# Patient Record
Sex: MALE | Race: WHITE | Employment: FULL TIME | URBAN - METROPOLITAN AREA
[De-identification: names, ages, dates, MRNs, and addresses within clinical notes are randomized per-mention and may not be internally consistent; named-entity substitution may affect disease eponyms.]

---

## 2019-04-05 ENCOUNTER — HOSPITAL ENCOUNTER (OUTPATIENT)
Age: 39
Setting detail: SPECIMEN
Discharge: HOME | End: 2019-04-05
Payer: COMMERCIAL

## 2019-04-06 LAB
ABSOLUTE EOS #: 0.37 K/UL (ref 0–0.44)
ABSOLUTE IMMATURE GRANULOCYTE: 0.06 K/UL (ref 0–0.3)
ABSOLUTE LYMPH #: 2.73 K/UL (ref 1.1–3.7)
ABSOLUTE MONO #: 0.96 K/UL (ref 0.1–1.2)
ALBUMIN SERPL-MCNC: 4.4 G/DL (ref 3.5–5.2)
ALBUMIN/GLOBULIN RATIO: 1.8 (ref 1–2.5)
ALP BLD-CCNC: 57 U/L (ref 40–129)
ALT SERPL-CCNC: 65 U/L (ref 5–41)
ANION GAP SERPL CALCULATED.3IONS-SCNC: 13 MMOL/L (ref 9–17)
AST SERPL-CCNC: 33 U/L
BASOPHILS # BLD: 1 % (ref 0–2)
BASOPHILS ABSOLUTE: 0.07 K/UL (ref 0–0.2)
BILIRUB SERPL-MCNC: 0.35 MG/DL (ref 0.3–1.2)
BUN BLDV-MCNC: 18 MG/DL (ref 6–20)
BUN/CREAT BLD: ABNORMAL (ref 9–20)
CALCIUM SERPL-MCNC: 9.4 MG/DL (ref 8.6–10.4)
CHLORIDE BLD-SCNC: 103 MMOL/L (ref 98–107)
CHOLESTEROL/HDL RATIO: 5.6
CHOLESTEROL: 185 MG/DL
CO2: 26 MMOL/L (ref 20–31)
CREAT SERPL-MCNC: 0.9 MG/DL (ref 0.7–1.2)
DIFFERENTIAL TYPE: ABNORMAL
EOSINOPHILS RELATIVE PERCENT: 3 % (ref 1–4)
GFR AFRICAN AMERICAN: >60 ML/MIN
GFR NON-AFRICAN AMERICAN: >60 ML/MIN
GFR SERPL CREATININE-BSD FRML MDRD: ABNORMAL ML/MIN/{1.73_M2}
GFR SERPL CREATININE-BSD FRML MDRD: ABNORMAL ML/MIN/{1.73_M2}
GLUCOSE BLD-MCNC: 91 MG/DL (ref 70–99)
HCT VFR BLD CALC: 47.2 % (ref 40.7–50.3)
HDLC SERPL-MCNC: 33 MG/DL
HEMOGLOBIN: 16.1 G/DL (ref 13–17)
IMMATURE GRANULOCYTES: 1 %
LDL CHOLESTEROL: 103 MG/DL (ref 0–130)
LYMPHOCYTES # BLD: 25 % (ref 24–43)
MCH RBC QN AUTO: 33.1 PG (ref 25.2–33.5)
MCHC RBC AUTO-ENTMCNC: 34.1 G/DL (ref 28.4–34.8)
MCV RBC AUTO: 97.1 FL (ref 82.6–102.9)
MONOCYTES # BLD: 9 % (ref 3–12)
NRBC AUTOMATED: 0 PER 100 WBC
PDW BLD-RTO: 11.9 % (ref 11.8–14.4)
PLATELET # BLD: 297 K/UL (ref 138–453)
PLATELET ESTIMATE: ABNORMAL
PMV BLD AUTO: 10.2 FL (ref 8.1–13.5)
POTASSIUM SERPL-SCNC: 4 MMOL/L (ref 3.7–5.3)
RBC # BLD: 4.86 M/UL (ref 4.21–5.77)
RBC # BLD: ABNORMAL 10*6/UL
SEG NEUTROPHILS: 61 % (ref 36–65)
SEGMENTED NEUTROPHILS ABSOLUTE COUNT: 6.68 K/UL (ref 1.5–8.1)
SODIUM BLD-SCNC: 142 MMOL/L (ref 135–144)
TOTAL PROTEIN: 6.8 G/DL (ref 6.4–8.3)
TRIGL SERPL-MCNC: 246 MG/DL
TSH SERPL DL<=0.05 MIU/L-ACNC: 1.46 MIU/L (ref 0.3–5)
VLDLC SERPL CALC-MCNC: ABNORMAL MG/DL (ref 1–30)
WBC # BLD: 10.9 K/UL (ref 3.5–11.3)
WBC # BLD: ABNORMAL 10*3/UL

## 2019-04-11 LAB — VON WILLEBRAND AG: 97 % (ref 50–160)

## 2019-06-26 ENCOUNTER — HOSPITAL ENCOUNTER (OUTPATIENT)
Age: 39
Setting detail: SPECIMEN
Discharge: HOME | End: 2019-06-26
Payer: COMMERCIAL

## 2019-06-27 LAB
C. TRACHOMATIS DNA ,URINE: NEGATIVE
N. GONORRHOEAE DNA, URINE: NEGATIVE
SOURCE: NORMAL
SPECIMEN DESCRIPTION: NORMAL
TRICHOMONAS VAGINALI, MOLECULAR: NEGATIVE

## 2021-09-09 ENCOUNTER — APPOINTMENT (OUTPATIENT)
Dept: GENERAL RADIOLOGY | Age: 41
End: 2021-09-09
Payer: COMMERCIAL

## 2021-09-09 ENCOUNTER — HOSPITAL ENCOUNTER (EMERGENCY)
Age: 41
Discharge: HOME | End: 2021-09-09
Payer: COMMERCIAL

## 2021-09-09 VITALS
TEMPERATURE: 97.9 F | DIASTOLIC BLOOD PRESSURE: 89 MMHG | WEIGHT: 315 LBS | BODY MASS INDEX: 40.43 KG/M2 | HEIGHT: 74 IN | HEART RATE: 84 BPM | OXYGEN SATURATION: 97 % | SYSTOLIC BLOOD PRESSURE: 134 MMHG | RESPIRATION RATE: 16 BRPM
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
## 2021-09-09 DIAGNOSIS — M79.18 MUSCULOSKELETAL PAIN: ICD-10-CM

## 2021-09-09 PROCEDURE — 73130 X-RAY EXAM OF HAND: CPT

## 2021-09-09 PROCEDURE — 72100 X-RAY EXAM L-S SPINE 2/3 VWS: CPT

## 2021-09-09 PROCEDURE — 99213 OFFICE O/P EST LOW 20 MIN: CPT

## 2021-09-09 PROCEDURE — 72040 X-RAY EXAM NECK SPINE 2-3 VW: CPT

## 2021-09-09 PROCEDURE — 99203 OFFICE O/P NEW LOW 30 MIN: CPT | Performed by: NURSE PRACTITIONER

## 2021-09-09 RX ORDER — CYCLOBENZAPRINE HCL 10 MG
10 TABLET ORAL NIGHTLY PRN
Qty: 7 TABLET | Refills: 0 | Status: SHIPPED | OUTPATIENT
Start: 2021-09-09 | End: 2021-09-16

## 2021-09-09 RX ORDER — IBUPROFEN 800 MG/1
800 TABLET ORAL EVERY 8 HOURS PRN
Qty: 30 TABLET | Refills: 0 | Status: SHIPPED | OUTPATIENT
Start: 2021-09-09

## 2021-09-09 RX ORDER — IBUPROFEN 400 MG/1
400 TABLET ORAL EVERY 6 HOURS PRN
COMMUNITY
End: 2021-09-09

## 2021-09-09 RX ORDER — FUROSEMIDE 20 MG/1
20 TABLET ORAL 2 TIMES DAILY
COMMUNITY

## 2021-09-09 ASSESSMENT — PAIN DESCRIPTION - DESCRIPTORS: DESCRIPTORS: SORE;SHARP

## 2021-09-09 ASSESSMENT — PAIN SCALES - GENERAL: PAINLEVEL_OUTOF10: 5

## 2021-09-09 ASSESSMENT — PAIN DESCRIPTION - FREQUENCY: FREQUENCY: CONTINUOUS

## 2021-09-09 ASSESSMENT — ENCOUNTER SYMPTOMS
DIARRHEA: 0
ABDOMINAL PAIN: 0
VOMITING: 0
COUGH: 0
BACK PAIN: 1
NAUSEA: 0
SHORTNESS OF BREATH: 0
WHEEZING: 0

## 2021-09-09 ASSESSMENT — PAIN DESCRIPTION - LOCATION: LOCATION: BACK;WRIST

## 2021-09-09 ASSESSMENT — ACTIVITIES OF DAILY LIVING (ADL): EFFECT OF PAIN ON DAILY ACTIVITIES: WORSE WITH MOVEMENT

## 2021-09-09 ASSESSMENT — PAIN DESCRIPTION - PAIN TYPE: TYPE: ACUTE PAIN

## 2021-09-09 NOTE — ED NOTES
Discharge instructions reviewed with patient. Patient informed to go to ER for worsening symptoms and to follow up with PCP. Patient ambulatory out in stable condition.       Zack Lew RN  09/09/21 0356
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
## 2022-05-27 ENCOUNTER — HOSPITAL ENCOUNTER (OUTPATIENT)
Age: 42
Discharge: HOME | End: 2022-05-27
Payer: COMMERCIAL

## 2022-05-27 LAB
ALBUMIN SERPL-MCNC: 4.7 G/DL (ref 3.5–5.1)
ALP BLD-CCNC: 53 U/L (ref 38–126)
ALT SERPL-CCNC: 63 U/L (ref 11–66)
ANION GAP SERPL CALCULATED.3IONS-SCNC: 10 MEQ/L (ref 8–16)
AST SERPL-CCNC: 40 U/L (ref 5–40)
BASOPHILS # BLD: 0.7 %
BASOPHILS ABSOLUTE: 0.1 THOU/MM3 (ref 0–0.1)
BILIRUB SERPL-MCNC: 1.3 MG/DL (ref 0.3–1.2)
BUN BLDV-MCNC: 15 MG/DL (ref 7–22)
C-REACTIVE PROTEIN: 0.68 MG/DL (ref 0–1)
CALCIUM SERPL-MCNC: 9.8 MG/DL (ref 8.5–10.5)
CHLORIDE BLD-SCNC: 99 MEQ/L (ref 98–111)
CHOLESTEROL, TOTAL: 194 MG/DL (ref 100–199)
CO2: 33 MEQ/L (ref 23–33)
CREAT SERPL-MCNC: 0.9 MG/DL (ref 0.4–1.2)
CREAT SERPL-MCNC: 0.9 MG/DL (ref 0.4–1.2)
EOSINOPHIL # BLD: 2.9 %
EOSINOPHILS ABSOLUTE: 0.3 THOU/MM3 (ref 0–0.4)
ERYTHROCYTE [DISTWIDTH] IN BLOOD BY AUTOMATED COUNT: 12.6 % (ref 11.5–14.5)
ERYTHROCYTE [DISTWIDTH] IN BLOOD BY AUTOMATED COUNT: 44.7 FL (ref 35–45)
GFR SERPL CREATININE-BSD FRML MDRD: > 90 ML/MIN/1.73M2
GFR SERPL CREATININE-BSD FRML MDRD: > 90 ML/MIN/1.73M2
GLUCOSE BLD-MCNC: 93 MG/DL (ref 70–108)
GLUCOSE BLD-MCNC: 93 MG/DL (ref 70–108)
HCT VFR BLD CALC: 49.4 % (ref 42–52)
HDLC SERPL-MCNC: 37 MG/DL
HEMOGLOBIN: 17 GM/DL (ref 14–18)
HOMOCYSTEINE: 8.1 UMOL/L
IMMATURE GRANS (ABS): 0.06 THOU/MM3 (ref 0–0.07)
IMMATURE GRANULOCYTES: 0.6 %
INR BLD: 1.05 (ref 0.85–1.13)
LDL CHOLESTEROL CALCULATED: 132 MG/DL
LYMPHOCYTES # BLD: 23.5 %
LYMPHOCYTES ABSOLUTE: 2.4 THOU/MM3 (ref 1–4.8)
MCH RBC QN AUTO: 32.9 PG (ref 26–33)
MCHC RBC AUTO-ENTMCNC: 34.4 GM/DL (ref 32.2–35.5)
MCV RBC AUTO: 95.7 FL (ref 80–94)
MONOCYTES # BLD: 7.9 %
MONOCYTES ABSOLUTE: 0.8 THOU/MM3 (ref 0.4–1.3)
NUCLEATED RED BLOOD CELLS: 0 /100 WBC
PLATELET # BLD: 200 THOU/MM3 (ref 130–400)
PMV BLD AUTO: 12 FL (ref 9.4–12.4)
POTASSIUM SERPL-SCNC: 3.6 MEQ/L (ref 3.5–5.2)
RBC # BLD: 5.16 MILL/MM3 (ref 4.7–6.1)
SEDIMENTATION RATE, ERYTHROCYTE: 8 MM/HR (ref 0–10)
SEG NEUTROPHILS: 64.4 %
SEGMENTED NEUTROPHILS ABSOLUTE COUNT: 6.7 THOU/MM3 (ref 1.8–7.7)
SODIUM BLD-SCNC: 142 MEQ/L (ref 135–145)
TOTAL PROTEIN: 7.6 G/DL (ref 6.1–8)
TRIGL SERPL-MCNC: 126 MG/DL (ref 0–199)
WBC # BLD: 10.4 THOU/MM3 (ref 4.8–10.8)

## 2022-05-27 PROCEDURE — 85651 RBC SED RATE NONAUTOMATED: CPT

## 2022-05-27 PROCEDURE — 82565 ASSAY OF CREATININE: CPT

## 2022-05-27 PROCEDURE — 36415 COLL VENOUS BLD VENIPUNCTURE: CPT

## 2022-05-27 PROCEDURE — 82947 ASSAY GLUCOSE BLOOD QUANT: CPT

## 2022-05-27 PROCEDURE — 80053 COMPREHEN METABOLIC PANEL: CPT

## 2022-05-27 PROCEDURE — 85305 CLOT INHIBIT PROT S TOTAL: CPT

## 2022-05-27 PROCEDURE — 86140 C-REACTIVE PROTEIN: CPT

## 2022-05-27 PROCEDURE — 83090 ASSAY OF HOMOCYSTEINE: CPT

## 2022-05-27 PROCEDURE — 85610 PROTHROMBIN TIME: CPT

## 2022-05-27 PROCEDURE — 85302 CLOT INHIBIT PROT C ANTIGEN: CPT

## 2022-05-27 PROCEDURE — 80061 LIPID PANEL: CPT

## 2022-05-27 PROCEDURE — 85025 COMPLETE CBC W/AUTO DIFF WBC: CPT

## 2022-06-01 LAB
PROTEIN C ANTIGEN: 87 % (ref 63–153)
PROTEIN S ANTIGEN, TOTAL: 116 % (ref 84–134)

## 2022-09-14 ENCOUNTER — OFFICE VISIT (OUTPATIENT)
Dept: PULMONOLOGY | Age: 42
End: 2022-09-14
Payer: COMMERCIAL

## 2022-09-14 VITALS
TEMPERATURE: 97.9 F | WEIGHT: 315 LBS | DIASTOLIC BLOOD PRESSURE: 80 MMHG | HEART RATE: 82 BPM | SYSTOLIC BLOOD PRESSURE: 132 MMHG | OXYGEN SATURATION: 90 % | BODY MASS INDEX: 40.43 KG/M2 | HEIGHT: 74 IN

## 2022-09-14 DIAGNOSIS — R40.0 EXCESSIVE SLEEPINESS WHILE DRIVING: ICD-10-CM

## 2022-09-14 DIAGNOSIS — G47.10 HYPERSOMNIA: Primary | ICD-10-CM

## 2022-09-14 DIAGNOSIS — E66.01 MORBID OBESITY (HCC): ICD-10-CM

## 2022-09-14 DIAGNOSIS — R06.02 SOB (SHORTNESS OF BREATH): ICD-10-CM

## 2022-09-14 DIAGNOSIS — R60.0 BILATERAL LOWER EXTREMITY EDEMA: ICD-10-CM

## 2022-09-14 PROCEDURE — 99205 OFFICE O/P NEW HI 60 MIN: CPT | Performed by: INTERNAL MEDICINE

## 2022-09-14 ASSESSMENT — ENCOUNTER SYMPTOMS
TROUBLE SWALLOWING: 0
SHORTNESS OF BREATH: 1
ALLERGIC/IMMUNOLOGIC NEGATIVE: 1
COLOR CHANGE: 0
CHEST TIGHTNESS: 0
ABDOMINAL DISTENTION: 0
APNEA: 0
EYES NEGATIVE: 1
VOICE CHANGE: 0
STRIDOR: 0
WHEEZING: 0
ABDOMINAL PAIN: 0
CHOKING: 1
COUGH: 0
BACK PAIN: 0

## 2022-09-14 NOTE — PROGRESS NOTES
Neck Circumference -     Mallampati -     Lung Nodule Screening     [] Qualifies    [] Does not qualify   [] Declined    [] Completed
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
## 2022-10-20 ENCOUNTER — HOSPITAL ENCOUNTER (OUTPATIENT)
Dept: SLEEP CENTER | Age: 42
Discharge: HOME | End: 2022-10-22
Payer: COMMERCIAL

## 2022-10-20 DIAGNOSIS — E66.01 MORBID OBESITY (HCC): ICD-10-CM

## 2022-10-20 DIAGNOSIS — R06.02 SOB (SHORTNESS OF BREATH): ICD-10-CM

## 2022-10-20 DIAGNOSIS — G47.10 HYPERSOMNIA: ICD-10-CM

## 2022-10-20 DIAGNOSIS — R60.0 BILATERAL LOWER EXTREMITY EDEMA: ICD-10-CM

## 2022-10-20 DIAGNOSIS — R40.0 EXCESSIVE SLEEPINESS WHILE DRIVING: ICD-10-CM

## 2022-10-20 PROCEDURE — 95806 SLEEP STUDY UNATT&RESP EFFT: CPT

## 2022-10-20 NOTE — PROGRESS NOTES
Miguelito Llanes presents today for a [employer] and demonstration on unit # [id number]. Questions were asked and answers given. He was able to return demonstration and verbalized understanding. The sleep center control room phone number was provided in case questions arise during the study. Informed patient to call 911 in case of an emergency. He states he will return the unit tomorrow before 1000. Covid screening questions asked.

## 2022-10-21 LAB — STATUS: NORMAL

## 2022-10-30 DIAGNOSIS — G47.33 OSA (OBSTRUCTIVE SLEEP APNEA): Primary | ICD-10-CM

## 2022-10-31 NOTE — PROGRESS NOTES
[address] [place] [address]                               SLEEP STUDY REPORT    PATIENT NAME: Pascual Cooley                      :        1980  MED REC NO:   [id number]                           ROOM:  ACCOUNT NO:   [de-identified]                           ADMIT DATE: 10/20/2022  PROVIDER:     OLLIE Richards [place] STUDY:  10/20/2022    HOME SLEEP STUDY REPORT    REFERRING PROVIDER:  Xavier Tabares MD    HISTORY OF PRESENT ILLNESS:  The patient is a 60-year-old gentleman  morbidly obese with BMI of 48.8, weight of 380 pounds, height 74 inches  who complains of nonrestorative sleep, snoring, breathing difficulties  during the night. METHODS:  The patient underwent Type III Portable Monitoring Sleep Study  including the simultaneous recording of oral-nasal airflow, rib and  abdominal respiratory effort, pulse rate, oxygen saturation, and body  position. Sleep staging and scoring followed the standard put forth by  the American Academy of Sleep Medicine and utilized the 1B obstructive  hypopnea event desaturation of 4 percent or greater. DETAILS OF THE STUDY:  The patient came by the sleep lab and picked up  his HST unit #[id number]. He was given instructions how to set it up. The  unit was returned the next morning. The information was successfully downloaded and scored. Total recording  time 479 minutes. Lights off time 11:04 p.m., lights on time 07:03 a.m. RESPIRATORY SUMMARY:  104 obstructive apneas, 2 central apneas, 750  obstructive hypopneas for an OMID of 107. 2.  523 events occurred during  supine position and 333 during non-supine position. PULSE OXIMETRY SUMMARY:  Mean oxygen saturation 74.5%, lowest oxygen  saturation 51%, there were 474 minutes of oxygen saturation below 88%.     BODY POSITION SUMMARY:  267 minutes or 55.9% of total recording time  spent in supine position, 211 minutes or 44% in non-supine position, 507  minutes or 1.2% in upright position. ECG SUMMARY:  Normal sinus rhythm. ASSESSMENT:  1. Severe obstructive sleep apnea with an OMID of 107 associated with  severe nocturnal oxygen desaturation. 2.  Nocturnal hypoventilation with low baseline oxygen saturation even  at lights off time with gaetano of 51% and total of 474 minutes of oxygen  saturation below 88%. RECOMMENDATIONS:  Due to the severity of the nocturnal oxygen  desaturation an attended titration polysomnogram is recommended.   The  patient will be followed up in the sleep clinic and if he is agreeable  we will bring him back to the sleep lab for PAP titration polysomnogram.        Yvonne Márquez M.D.    D: 10/30/2022 21:07:41       T: 10/31/2022 18:01:46     [email or web address]  Job#: [id number]     Doc#: [id number]    CC:

## 2022-11-04 ENCOUNTER — TELEPHONE (OUTPATIENT)
Dept: SLEEP CENTER | Age: 42
End: 2022-11-04

## 2022-11-04 NOTE — TELEPHONE ENCOUNTER
Please schedule a f/u appt for [place]. His titration is on 12/18/22.         Thank you,  Denae Collado

## 2022-11-23 ENCOUNTER — TELEPHONE (OUTPATIENT)
Dept: SLEEP CENTER | Age: 42
End: 2022-11-23

## 2022-11-23 NOTE — TELEPHONE ENCOUNTER
Dr. Dimas Arriaza,   Please advise how you wish to proceed. Thank you. [place] Piter 1980 scheduled 12/18/2022    \"Maru has denied an in-lab titration due to not being medically necessary. If you do not agree with this determination a peer to peer can be done by calling [phone] refer to reference ID # [id number]. \"    Clinical Rationale: Your doctor told us that you have a condition that causes short periods of not breathing while asleep (sleep apnea). Your doctor ordered a test to adjust your treatment. This test is needed if you have a reason why a different home treatment device cannot be used. These reasons might include lung or heart disease. We reviewed the notes we have. The notes do not show that you have one of these conditions. Based on the information we have, this test is not medically necessary. We used [facility] Health Guidelines titled Sleep Disorder Management, Polysomnography and Home Sleep Testing to make this decision. You may view this guideline at [facility]..     Jake attached    Thank you;    Lisa , [facility]

## 2022-11-28 ENCOUNTER — TELEPHONE (OUTPATIENT)
Dept: PULMONOLOGY | Age: 42
End: 2022-11-28

## 2022-11-28 DIAGNOSIS — G47.33 OSA (OBSTRUCTIVE SLEEP APNEA): Primary | ICD-10-CM

## 2022-11-28 NOTE — TELEPHONE ENCOUNTER
Completed peer to peer today for patient titration study. Authorization number [id number]. Authorization good till 1/19/23.

## 2022-11-30 ENCOUNTER — TELEPHONE (OUTPATIENT)
Dept: SLEEP CENTER | Age: 42
End: 2022-11-30

## 2022-12-12 ENCOUNTER — TELEPHONE (OUTPATIENT)
Dept: PULMONOLOGY | Age: 42
End: 2022-12-12

## 2022-12-12 NOTE — TELEPHONE ENCOUNTER
Patient called and is suppose to follow up with you in January, Kimberly had ordered an Echo that has . Are you able to put a new order in.

## 2022-12-13 DIAGNOSIS — R06.02 SOB (SHORTNESS OF BREATH): Primary | ICD-10-CM

## 2023-01-13 ENCOUNTER — HOSPITAL ENCOUNTER (OUTPATIENT)
Dept: NON INVASIVE DIAGNOSTICS | Age: 43
Discharge: HOME | End: 2023-01-13
Payer: COMMERCIAL

## 2023-01-13 DIAGNOSIS — R06.02 SOB (SHORTNESS OF BREATH): ICD-10-CM

## 2023-01-13 LAB
LV EF: 53 %
LVEF MODALITY: NORMAL

## 2023-01-13 PROCEDURE — 93306 TTE W/DOPPLER COMPLETE: CPT

## 2023-01-18 ENCOUNTER — HOSPITAL ENCOUNTER (OUTPATIENT)
Dept: SLEEP CENTER | Age: 43
Discharge: HOME | End: 2023-01-20
Payer: COMMERCIAL

## 2023-01-18 DIAGNOSIS — G47.33 OSA (OBSTRUCTIVE SLEEP APNEA): ICD-10-CM

## 2023-01-18 PROCEDURE — 95811 POLYSOM 6/>YRS CPAP 4/> PARM: CPT

## 2023-01-19 DIAGNOSIS — G47.33 OSA TREATED WITH BIPAP: Primary | ICD-10-CM

## 2023-01-19 DIAGNOSIS — G47.10 HYPERSOMNIA: ICD-10-CM

## 2023-01-19 DIAGNOSIS — G47.34 NOCTURNAL HYPOXIA: ICD-10-CM

## 2023-01-19 DIAGNOSIS — R40.0 EXCESSIVE SLEEPINESS WHILE DRIVING: ICD-10-CM

## 2023-01-19 LAB — STATUS: NORMAL

## 2023-01-20 NOTE — PROGRESS NOTES
[address] [place] [address]                               SLEEP STUDY REPORT    PATIENT NAME: Gavi Espinal                      :        1980  MED REC NO:   [id number]                           ROOM:  ACCOUNT NO:   [de-identified]                           ADMIT DATE: 2023  PROVIDER:     Brendan Rodriguez MD    DATE OF STUDY:  2023    CPAP/BIPAP TITRATION STUDY REPORT    REFERRING PROVIDER:  Anil Reza CNP    The patient's height is 74 inches, weight is 380 pounds with a BMI of  48.8. HISTORY:  The patient is a 79-year-old gentleman who used to follow with  Cheryl Solano MD.  The patient underwent initial baseline sleep study on  10/20/2022. The patient was diagnosed with severe obstructive sleep  apnea with respiratory effort index of 107 per hour. The patient also  had nocturnal desaturation. The patient spent a total of 474 minutes  below oxygen saturation less than 88%. The patient is scheduled for  in-lab CPAP/BiPAP titration due to nocturnal hypoventilation noted  during the portable/home sleep study. METHODS:  The patient underwent digital polysomnography in compliance  with the standards and specifications from the AASM Manual including the  simultaneous recording of 3 EEG channels (F4-M1, C4-M1, and O2-M1 with  back up electrodes F3-M2, C3-M2, and O1-M2), 2 EOG channels (E1-M2, and  E2-M1,), EMG (chin, left & right leg), EKG, Nonin pulse oximetry with   less than 2 second averaging time, body position, airflow recorded by  oral-nasal thermal sensor and nasal air pressure transducer, plus  respiratory effort recorded by calibrated respiratory inductance  plethysmography (RIP), flow volume loop, sound and video.   Sleep staging  and scoring followed the standard put forth by the American Academy of  Sleep Medicine and utilized the 1B obstructive hypopnea event  desaturation of 4 percent or greater. INTERPRETATION:  This is a CPAP/BiPAP titration study. The study was  performed on 01/18/2023. The study was started at 09:30 p.m. and was  terminated at 05:18 a.m. with a total recording time of 468.2 minutes,  sleep period time was 458.3 minutes, total sleep time was 415 minutes,  and overall sleep efficiency was 88.6% of total sleep time. The sleep  onset latency was 9.9 minutes, wake after sleep onset was 43.3 minutes,  and REM sleep latency was 36 minutes. SLEEP STAGING AND DISTRIBUTION SUMMARY:  Revealed the patient spent 50  minutes in stage I consisting of 12% of total sleep time, 227.5 minutes  in stage II consisting of 54.8%, 137.5 minutes in REM sleep consisting  of 33.1% of total sleep time. The patient had absent slow-wave sleep. CPAP/BIPAP TITRATION STUDY:  The CPAP/BiPAP titration study was started  with the CPAP pressure of 5 cm water and the CPAP pressure was gradually  increased to a CPAP pressure of 15 cm of water by titrating to apneas  and hypopneas. Due to persistence of residual uncontrolled respiratory  events and hypoxia and failed CPAP therapy, the CPAP mode was changed to  BiPAP pressures with a starting BiPAP pressure of 18/14 cm of water. The BiPAP pressures were further increased to a final BiPAP pressure of  25/21 cm of water. At a final BiPAP pressure of 25/21 cm of water, the  patient spent 3 hours 33 minutes in bed. Out of 3 hours 33 minutes, the  patient slept for a period of 54 minutes in REM sleep and 2 hours and 17  minutes in non-REM sleep. At a final BiPAP pressure of 25/21 cm of  water, the patient had a total of 50 obstructive hypopneas with an  apnea-hypopnea index of 15.6. The patient had a maximum oxygen  desaturation to 73% with a mean oxygen saturation of 86.3%. This  titration was performed on room air. Towards the end of the study, the  patient's oxygen saturation came up to 88% to 90% in few Epochs.     PERIODIC LIMB MOVEMENT ANALYSIS: Revealed the patient did not have any  periodic limb movements. EKG MONITORING:  Revealed normal sinus rhythm. IMPRESSION:  1. Severe obstructive sleep apnea diagnosed by a portable/home sleep  study on 10/20/2022. The patient had suboptimal titration up to a final  BiPAP pressure of 25/21 cm of water. The patient still left with some  uncontrolled respiratory events. The patient still found to be hypoxic. 2.  Nocturnal hypoxia noted during the baseline portable sleep study,  improved with BiPAP therapy; however, the patient still found to be  hypoxic on room air. 3.  Hypersomnia, most likely due to sleep apnea. RECOMMENDATIONS:  1. For the patient's sleep-disordered breathing, we recommend starting  therapy with a BiPAP pressures of 25/21 cm of water. The patient  requires 2 liters of oxygen bled-in with his BiPAP device due to  nocturnal hypoxia noted during the titration study even at a final BiPAP  pressure of 25/21 cm of water. 2.  Specific recommendations include, the patient's choice of interface  was large size Dana mask. 3.  The patient should be scheduled for a followup with Michael Orantes PA-C or with [facility] MD's clinic in six to eight weeks on  recommended final BiPAP pressures for further adjustment of BiPAP  pressure settings. Thanks to Santa Georges MD, and Brandy Fregoso CNP, for giving me this  opportunity to participate in the care of this pleasant gentleman.         Rimma Lombardi MD    D: 01/19/2023 16:48:12       T: 01/20/2023 15:05:13     [email or web address]  Job#: [id number]     Doc#: [id number]    CC:

## 2023-01-23 ENCOUNTER — TELEPHONE (OUTPATIENT)
Dept: SLEEP CENTER | Age: 43
End: 2023-01-23

## 2023-01-24 ENCOUNTER — HOSPITAL ENCOUNTER (OUTPATIENT)
Dept: PULMONOLOGY | Age: 43
Discharge: HOME | End: 2023-01-24
Payer: COMMERCIAL

## 2023-01-24 DIAGNOSIS — R06.02 SOB (SHORTNESS OF BREATH): ICD-10-CM

## 2023-01-24 PROCEDURE — 94726 PLETHYSMOGRAPHY LUNG VOLUMES: CPT

## 2023-01-24 PROCEDURE — 94729 DIFFUSING CAPACITY: CPT

## 2023-01-24 PROCEDURE — 94060 EVALUATION OF WHEEZING: CPT

## 2023-01-26 ENCOUNTER — OFFICE VISIT (OUTPATIENT)
Dept: PULMONOLOGY | Age: 43
End: 2023-01-26
Payer: COMMERCIAL

## 2023-01-26 VITALS
WEIGHT: 315 LBS | SYSTOLIC BLOOD PRESSURE: 144 MMHG | TEMPERATURE: 97.7 F | DIASTOLIC BLOOD PRESSURE: 100 MMHG | HEART RATE: 86 BPM | BODY MASS INDEX: 40.43 KG/M2 | HEIGHT: 74 IN | OXYGEN SATURATION: 91 %

## 2023-01-26 DIAGNOSIS — E66.01 MORBID OBESITY (HCC): ICD-10-CM

## 2023-01-26 DIAGNOSIS — R06.2 WHEEZING: ICD-10-CM

## 2023-01-26 DIAGNOSIS — R06.02 SOB (SHORTNESS OF BREATH): Primary | ICD-10-CM

## 2023-01-26 PROCEDURE — 99214 OFFICE O/P EST MOD 30 MIN: CPT | Performed by: NURSE PRACTITIONER

## 2023-01-26 PROCEDURE — 94010 BREATHING CAPACITY TEST: CPT | Performed by: NURSE PRACTITIONER

## 2023-01-26 ASSESSMENT — ENCOUNTER SYMPTOMS
WHEEZING: 1
STRIDOR: 0
ALLERGIC/IMMUNOLOGIC NEGATIVE: 1
SHORTNESS OF BREATH: 1
EYES NEGATIVE: 1
DIARRHEA: 0
CHEST TIGHTNESS: 0
VOMITING: 0
GASTROINTESTINAL NEGATIVE: 1
NAUSEA: 0

## 2023-01-26 NOTE — PROGRESS NOTES
[facility]    Patient: Selina Calix, 36 y.o.   : 1980  2023    Pt of Dr. Pat Farley     Chief Complaint   Patient presents with    Follow-up     Echo  pft  pulm f/u        HPI  Windy Gonzalez is here for follow up for SOB previously seen per Dr. China Koenig  PFT done recently with no obstruction and slight restriction most likely 2/2 morbid obesity  Patient c/o today still SOB w/exertion and wheezing at times  Never a smoker   Awaiting PAP set up (+) ROMA- HST done AHI of 15.6 w/nocturnal hypoxia  Morbid obesity BMI 49  (+) leg swelling bilaterally - ECHO done 23 impression WNL- refer to Cardiology today    Patient is experiencing SOB: yes, with exertion     Patient is experiencing wheezing: Yes- occasionally      Patient states they have had a Strong, productive = 1 cough. Phlegm is daily, clear, thin      Patient is coughing up blood: no     Patient has been experiencing chest pains: non-existent     Patient is currently taking the following inhaler(s): n/a     Patient is currently taking the following nebulizer treatment(s): n/a     Patient is using their rescue inhaler 0 times per n/a. Patient is currently using 0 liters of oxygen during n/a. Patient needs refills of the following medications: n/a     All refills should be sent to n/a     Other: sleep study was done. .. Progress History:   Since last visit any new medical issues? No  New ER or hospital visits? No  Any new or changes in medicines? No  Using inhalers? No  Are they helpful? No  Flu vaccine No  Pneumonia vaccine- No  Covid vaccine- vaccine x 2 and 1 booster     Past Medical hx   PMH:  Past Medical History:   Diagnosis Date    MS (multiple sclerosis) ([facility] [address]      SURGICAL HISTORY:History reviewed. No pertinent surgical history.   SOCIAL HISTORY:  Social History     Tobacco Use    Smoking status: Never    Smokeless tobacco: Never   Substance Use Topics    Alcohol use: Never    Drug use: Never     ALLERGIES:No Known Allergies  FAMILY HISTORY:History reviewed. No pertinent family history. CURRENT MEDICATIONS:  Current Outpatient Medications   Medication Sig Dispense Refill    diclofenac sodium (VOLTAREN) 1 % GEL Apply topically 2 times daily      furosemide (LASIX) 20 MG tablet Take 20 mg by mouth 2 times daily (Patient not taking: Reported on 9/14/2022)      Potassium (POTASSIMIN PO) Take by mouth (Patient not taking: Reported on 9/14/2022)      Sildenafil Citrate (VIAGRA PO) Take by mouth      ibuprofen (IBU) 800 MG tablet Take 1 tablet by mouth every 8 hours as needed for Pain Take with food. 30 tablet 0     No current facility-administered medications for this visit. ROS   Review of Systems   Constitutional:  Positive for fatigue. Negative for chills, fever and unexpected weight change. HENT: Negative. Eyes: Negative. Respiratory:  Positive for shortness of breath and wheezing. Negative for chest tightness and stridor. Cardiovascular:  Positive for leg swelling. Negative for chest pain. Gastrointestinal: Negative. Negative for diarrhea, nausea and vomiting. Endocrine: Negative. Genitourinary: Negative. Negative for dysuria. Musculoskeletal: Negative. Skin: Negative. Allergic/Immunologic: Negative. Neurological: Negative. Hematological: Negative. Psychiatric/Behavioral: Negative. Physical exam   BP (!) 144/100 (Site: Left Upper Arm, Position: Sitting, Cuff Size: Large Adult)   Pulse 86   Temp 97.7 °F (36.5 °C) (Infrared)   Ht 6' 2\" (1.88 m)   Wt (!) 386 lb 3.2 oz (175.2 kg)   SpO2 91%   BMI 49.59 kg/m²    Wt Readings from Last 3 Encounters:   01/26/23 (!) 386 lb 3.2 oz (175.2 kg)   09/14/22 (!) 380 lb 9.6 oz (172.6 kg)   09/09/21 (!) 358 lb (162.4 kg)       Physical Exam  Vitals and nursing note reviewed. Constitutional:       General: He is not in acute distress. Appearance: He is morbidly obese.    HENT:      Head: Normocephalic and atraumatic. Neck:      Trachea: No tracheal deviation. Cardiovascular:      Rate and Rhythm: Normal rate and regular rhythm. Heart sounds: Normal heart sounds. No murmur heard. Pulmonary:      Effort: Pulmonary effort is normal. No respiratory distress. Breath sounds: Normal breath sounds. No stridor. No wheezing or rales. Chest:      Chest wall: No tenderness. Abdominal:      General: Bowel sounds are normal. There is no distension. Palpations: Abdomen is soft. Musculoskeletal:      Right lower leg: Edema present. Left lower leg: Edema present. Skin:     General: Skin is warm and dry. Capillary Refill: Capillary refill takes less than 2 seconds. Neurological:      Mental Status: He is alert and oriented to person, place, and time. Psychiatric:         Behavior: Behavior normal.         Thought Content: Thought content normal.         Judgment: Judgment normal.        Results   Lung Nodule Screening     [] Qualifies    [x] Does not qualify   [] Declined    [] Completed    The USPSTF recommends annual screening for lung cancer with low-dose computed tomography (LDCT) in adults aged 48 to [de-identified] years who have a 30 pack-year smoking history and currently smoke or have quit within the past 20 years. Screening should be discontinued once a person has not smoked for 20 years or develops a health problem that substantially limits life expectancy or the ability or willingness to have curative lung surgery. 01/13/2023   ECHOCARDIOGRAM COMPLETE 2D W DOPPLER W COLOR. Conclusions      Summary   Technically difficult examination. Normal left ventricle size and systolic function. Ejection fraction was   estimated at 50 TO 55 %. There were no regional left ventricular wall   motion abnormalities and wall thickness was within normal limits.       Signature      ----------------------------------------------------------------   Electronically signed by Caio Ya Vanda SANCHEZ (Interpreting   physician) on 01/13/2023 at 05:40 PM   ----------------------------------------------------------------        Assessment      Diagnosis Orders   1. SOB (shortness of breath)  POCT Nitric Oxide    [facility]      2. Wheezing  POCT Nitric Oxide      3.  Morbid obesity ([address]              Plan   -ECHO reviewed with patient   -PFT reviewed with restriction seen   -NIOX today in the office 21 ppb no acute airway inflammation seen   -Weight loss encouraged will help with SOB  -Advised to maintain pneumonia vaccine with PCP and to take flu vaccine this coming season.  -Advised patient to call office with any changes, questions, or concerns regarding respiratory status  -Referral to Cardiology for SOB and bilateral leg swelling  -RTC 2-3 months after PAP set up will have office schedule today      Will see Darrin Mackenzie back in: PRN for pulmonary     Price Ludwig CNP  1/26/2023

## 2023-01-26 NOTE — PROGRESS NOTES
Patient is experiencing SOB: yes, with exertion    Patient is experiencing wheezing: Yes- occasionally     Patient states they have had a Strong, productive = 1 cough. Phlegm is daily, clear, thin     Patient is coughing up blood: no    Patient has been experiencing chest pains: non-existent    Patient is currently taking the following inhaler(s): n/a    Patient is currently taking the following nebulizer treatment(s): n/a    Patient is using their rescue inhaler 0 times per n/a. Patient is currently using 0 liters of oxygen during n/a. Patient needs refills of the following medications: n/a    All refills should be sent to n/a    Other: sleep study was done. ..

## 2023-01-27 ENCOUNTER — OFFICE VISIT (OUTPATIENT)
Dept: CARDIOLOGY CLINIC | Age: 43
End: 2023-01-27
Payer: COMMERCIAL

## 2023-01-27 VITALS
WEIGHT: 283 LBS | HEART RATE: 96 BPM | SYSTOLIC BLOOD PRESSURE: 155 MMHG | BODY MASS INDEX: 36.32 KG/M2 | DIASTOLIC BLOOD PRESSURE: 99 MMHG | HEIGHT: 74 IN

## 2023-01-27 DIAGNOSIS — R60.0 PEDAL EDEMA: Primary | ICD-10-CM

## 2023-01-27 PROCEDURE — 99204 OFFICE O/P NEW MOD 45 MIN: CPT | Performed by: INTERNAL MEDICINE

## 2023-01-27 NOTE — ADDENDUM NOTE
Addended by: Ervin [facility] on: 1/27/2023 11:49 AM     Modules accepted: Orders

## 2023-01-27 NOTE — PROGRESS NOTES
[id number] [facility] [address] [place] [address]  Dept: [phone]  Dept Fax: [phone]  Loc: [phone]    Visit Date: 1/27/2023    Mr. Maria D Lopez is a 36 y.o. male  who presented for:  Chief Complaint   Patient presents with    New Patient     SOB, bilateral leg swelling       HPI:   35 yo M c Morbid obesity, MS, ROMA scheduled for CPAP is referred for pedal edema. Patient BMI is 36. He is currently getting fitted for CPAP. He states he falls asleep all the time. He also reports his energy levels low. Current Outpatient Medications:     furosemide (LASIX) 20 MG tablet, Take 20 mg by mouth 2 times daily, Disp: , Rfl:     Sildenafil Citrate (VIAGRA PO), Take by mouth, Disp: , Rfl:     ibuprofen (IBU) 800 MG tablet, Take 1 tablet by mouth every 8 hours as needed for Pain Take with food. , Disp: 30 tablet, Rfl: 0    Potassium (POTASSIMIN PO), Take by mouth (Patient not taking: No sig reported), Disp: , Rfl:     Past Medical History  Corey Ervin  has a past medical history of MS (multiple sclerosis) ([facility] [address] Social History  Corey Ervin  reports that he has never smoked. He has never used smokeless tobacco. He reports that he does not drink alcohol and does not use drugs. Family History  Corey Ervin family history is not on file. Past Surgical History   No past surgical history on file. Subjective:     REVIEW OF SYSTEMS  Constitutional: denies sweats, chills and fever  HENT: denies  congestion, sinus pressure, sneezing and sore throat. Eyes: denies  pain, discharge, redness and itching. Respiratory: denies apnea, cough  Gastrointestinal: denies blood in stool, constipation, diarrhea   Endocrine: denies cold intolerance, heat intolerance, polydipsia. Genitourinary: denies dysuria, enuresis, flank pain and hematuria. Musculoskeletal: denies arthralgias, joint swelling and neck pain. Neurological: denies numbness and headaches.   Psychiatric/Behavioral: denies agitation, confusion, decreased concentration and dysphoric mood    All others reviewed and are negative.   Objective:     BP (!) 162/100   Pulse (!) 104   Ht 6' 2\" (1.88 m)   Wt 283 lb (128.4 kg)   BMI 36.34 kg/m²     Wt Readings from Last 3 Encounters:   01/27/23 283 lb (128.4 kg)   01/26/23 (!) 386 lb 3.2 oz (175.2 kg)   09/14/22 (!) 380 lb 9.6 oz (172.6 kg)     BP Readings from Last 3 Encounters:   01/27/23 (!) 162/100   01/26/23 (!) 144/100   09/14/22 132/80       PHYSICAL EXAM  Constitutional: Oriented to person, place, and time. Appears well-developed and well-nourished.   HENT:   Head: Normocephalic and atraumatic.   Eyes: EOM are normal. Pupils are equal, round, and reactive to light.   Neck: Normal range of motion. Neck supple. No JVD present.   Cardiovascular: Normal rate , normal heart sounds and intact distal pulses.    Pulmonary/Chest: Effort normal and breath sounds normal. No respiratory distress. No wheezes. No rales.   Abdominal: Soft. Bowel sounds are normal. No distension. There is no tenderness.   Musculoskeletal: Normal range of motion. 2+ edema.   Neurological: Alert and oriented to person, place, and time. No cranial nerve deficit. Coordination normal.   Skin: Skin is warm and dry.   Psychiatric: Normal mood and affect.       No results found for: CKTOTAL, CKMB, CKMBINDEX    Lab Results   Component Value Date/Time    WBC 10.4 05/27/2022 03:20 PM    RBC 5.16 05/27/2022 03:20 PM    HGB 17.0 05/27/2022 03:20 PM    HCT 49.4 05/27/2022 03:20 PM    MCV 95.7 05/27/2022 03:20 PM    MCH 32.9 05/27/2022 03:20 PM    MCHC 34.4 05/27/2022 03:20 PM    RDW 11.9 04/05/2019 05:35 PM     05/27/2022 03:20 PM    MPV 12.0 05/27/2022 03:20 PM       Lab Results   Component Value Date/Time     05/27/2022 03:20 PM    K 3.6 05/27/2022 03:20 PM    CL 99 05/27/2022 03:20 PM    CO2 33 05/27/2022 03:20 PM    BUN 15 05/27/2022 03:20 PM    LABALBU 4.7 05/27/2022 03:20 PM     CREATININE 0.9 05/27/2022 03:20 PM    CREATININE 0.9 05/27/2022 03:20 PM    CALCIUM 9.8 05/27/2022 03:20 PM    GFRAA >60 04/05/2019 05:35 PM    LABGLOM >90 05/27/2022 03:20 PM    LABGLOM >90 05/27/2022 03:20 PM    GLUCOSE 93 05/27/2022 03:20 PM    GLUCOSE 93 05/27/2022 03:20 PM       Lab Results   Component Value Date/Time    ALKPHOS 53 05/27/2022 03:20 PM    ALT 63 05/27/2022 03:20 PM    AST 40 05/27/2022 03:20 PM    PROT 7.6 05/27/2022 03:20 PM    BILITOT 1.3 05/27/2022 03:20 PM    LABALBU 4.7 05/27/2022 03:20 PM       No results found for: MG    Lab Results   Component Value Date    INR 1.05 05/27/2022         No results found for: LABA1C    Lab Results   Component Value Date/Time    TRIG 126 05/27/2022 03:20 PM    HDL 37 05/27/2022 03:20 PM    LDLCALC 132 05/27/2022 03:20 PM       Lab Results   Component Value Date/Time    TSH 1.46 04/05/2019 05:35 PM         Testing Reviewed:      I haveindividually reviewed the below cardiac tests    EKG:    ECHO: No results found for this or any previous visit. STRESS:    CATH:    Assessment/Plan       Diagnosis Orders   1. Pedal edema          Generalized fatigue  Hyposomnia  Morbidly obese  ROMA  Pedal edema     Needs EKG  Reviewed Echo  Will refer to lymphedema clinic  Will refer to weight loss clinic  The patient is asked to make an attempt to improve diet and exercise patterns to aid in medical management of this problem. Advised more plant based nutrition/meditarrean diet   Advised patient to call office or seek immediate medical attention if there is any new onset of  any chest pain, sob, palpitations, lightheadedness, dizziness, orthopnea, PND or pedal edema. All medication side effects were discussed in details. Thank youfor allowing me to participate in the care of this patient. Please do not hesitate to contact me for any further questions. Return if symptoms worsen or fail to improve, for Review testing, Regular follow up.        Electronically signed by Kristen Santacruz MD [facility]  1/27/2023 at 11:18 AM EST

## 2023-02-10 ENCOUNTER — HOSPITAL ENCOUNTER (OUTPATIENT)
Dept: PHYSICAL THERAPY | Age: 43
Setting detail: THERAPIES SERIES
Discharge: HOME | End: 2023-02-10
Payer: COMMERCIAL

## 2023-02-10 PROCEDURE — 97140 MANUAL THERAPY 1/> REGIONS: CPT

## 2023-02-10 NOTE — PROGRESS NOTES
** PLEASE SIGN, DATE AND TIME CERTIFICATION BELOW AND RETURN TO [facility] OUTPATIENT REHABILITATION (FAX #: [phone]). ATTEST/CO-SIGN IF ACCESSING VIA [employer]. THANK YOU.**    I certify that I have examined the patient below and determined that Physical Medicine and Rehabilitation service is necessary and that I approve the established plan of care for up to 90 days or as specifically noted. Attestation, signature or co-signature of physician indicates approval of certification requirements.    ________________________ ____________ __________  Physician Signature   Date   Time  [facility]  PHYSICAL THERAPY  [x] LYMPHEDEMA SERVICES EVALUATION  [] DAILY NOTE [] PROGRESS NOTE [] DISCHARGE NOTE    [x] OUTPATIENT REHABILITATION [facility]   (Lymphedema Services)   [] [address]    [] [facility]   [] Leilani Beverage    Date: 2/10/2023  Patient Name:  Gladys Mullins  : 1980  MRN: [id number]  CSN: [id number]    Referring Practitioner Dr. Larry Giang MD   Diagnosis Localized edema [R60.0]    Treatment Diagnosis -Lymphedema swelling of bilateral lower extremities and lower truncal region. Date of Evaluation 2/10/23       Functional Outcome Measure Used LYMPHEDEMA LIFE IMPACT SCALE (LLIS)-Version 2. Functional Outcome Score 30 (2/10/23-Baseline score)       Insurance: Primary: Payor: Nathaneil Goodpasture /  /  / ,   Secondary:    Authorization Information: PRE CERTIFICATION REQUIRED: Sherine Vizcaino @ [phone] SPOKE TO [place] [address] FROM 02/10/23-23. AFTER EVAL WE MUST GET ADDITIONAL Conchis Noreen RUTH @ [phone] REF# [address]ia PU[id number]   Visit # 1,  (Pre-cert required)   Visits Allowed: -Waiting on insurance authorization. Recertification Date:  (12 Weeks)   Physician Follow-Up: No follow up appointment scheduled with patient's referring provider.     [facility]      Cancer History No History of Cancer   Prior Lymphedema Treatment -Utilized Compression Garment: Worn almost daily (Over-the-counter) knee high (30-40 mm Hg). -Utilized Elevation: Overnight swelling decreases   -Exercise: Limited exercise program due to significant fatigue due to severe sleep apnea, which he has starting using a BiPAP machine with oxygen approximately one week ago. -Diet: Regular diet. Additional Pertinent History -Multiple Sclerosis   Restrictions/Precautions Standard/Universal Precautions, Fall Risk, and High Risk for Infection   Pain 2/10 - Pain location: Bilateral lower extremities. Pain Description: Slightly hypersensitive to touch, tight. SUBJECTIVE:     The patient is a 36year old male who presented to the Lymphedema clinic with moderate swelling in bilateral lower extremities (Left > Right) and lower truncal region. The patient reported that he started having discoloration of bilateral lower extremities many years ago and minimal intermittent swelling in the left ankle region. The patient reported that the swelling has gotten progressively worse in severity in bilateral lower extremities and progressing proximally. The patient reported that he has currently started using a BiPAP machine and feels that he is less fatigued and may be able to exercises more. Social/Functional History and Current Status:  Medications and Allergies have been reviewed and are listed on Medical History Questionnaire. Hiram Soto lives with spouse in a multiple floor home with ability to complete ADL's on main floor with stairs and one step to enter. ( Right knee pain with descending steps)  Support System: Spouse/Significant Other and Extended Family    Task Previous Current   Bilateral Lower Extremity/Extremities Range [facility]   Bilateral Lower Extremity/Extremities Strength [facility]   Sensation [facility] (Patient was able to differentiate between right/left lower extremities with light touch). -Patient denied numbness/tingling in bilateral lower extremities during eval.    ADL'S   Bathing Independent Independent   Dressing Independent -Requires assistance from his wife with donning socks and wears slip on shoes.  -Increased shoe sizes due to large amount of swelling. Toileting Independent Independent   Homemaking Independent -Difficulty with prolonged standing/walking. Equipment Used NA NA   Functional Mobility   Ambulation Independent Modified Independent   Transfers Independent Independent   Community Integration   Driving Independent Independent   Work Independent -Prolonged sitting which increases the swelling level. Recreation Independent -Difficulty with prolonged standing/walking. OBJECTIVE  Lymphedema Assessment of Bilateral Lower Extremity/Extremities  Hyperkeratosis - Abnormal thickening of the outer layer of the skin. None   Hyperplasia - Presence of too many lymphatic vessels that do not function properly. Mild   Hyperpigmentation - Darkening of skin Moderate   Papillomas - Outward growth on skin None   Lymphorrhea (Weeping) - Lymph leakage through the skin, also known as Weeping Lymphedema. None     Pitting Edema Moderate (+2)-Dorsum of bilateral feet/ankles and distal 1/4 of lower legs. Minimal (+1)-Mid calf to distal medial thigh region. Skin Condition Dry (Minimal dry skin noted). Open Wound(s) No Open Wounds   Tissue Temperature Normal   Skin Folds None   Fibrotic Tissue Mild   Orange Peel Texture None   Nailbed Appearance/Condition [facility]     Circferential Measurements  Circumferential Measurements for 2/10/2023 are as follows: Baseline measurements. [place](s) Left Right   Thigh (70 cm) 74.0 cm 72.6 cm   Mid-patella (53 cm) 51.0 cm 49.0 cm   Calf (35 cm) 52.3 cm 48.5 cm   Ankle (10 cm) 29.3 cm 28.9 cm   Metatarsal heads  26.3 cm 25.4 cm   (A) (Lower) Met.  Heads to Mid-patella (45 cm) 425.9 cm 407.4 cm   NOTE: Left Lower leg (Total circumference) is 18.5 cm larger than the right lower leg. Conservative Lymphedema Treatment  Precautions: -Refer to \"Precautions/Restrictions\" above. Pain: -Refer to \"Pain\" above. X in shaded column indicates activity completed today   Treatment/Intervention   Notes   Skin Care Instructed patient on skin care precautions. and Instructed patient on steps to prevent edema and infection. X BLE   Compression Bandaging None Provided     Compression Pump -PATIENT WOULD BE A GOOD CANDIDATE FOR PNEUMATIC COMPRESSION PUMP FOR HOME AT DISCHARGE FOR HIS HOME LYMPHEDEMA PROGRAM.  -    Kinesiotaping -     Decongestive Therapy Exercises The patient was able to complete Decongestive Therapy exercises (x 10 reps) including ankle pumps. The exercises were completed with compression bandages on, without complaints of pain from the patient. The Decongestive Therapy exercises assist with decreasing the swelling by increasing the muscle pumping action of the lymph collectors and by increasing the fluid uptake in the lymphatic system. X -BLE  -Instructed the patient to complete x 10 ankle pumps hourly. Manual Therapy Time/Technique  Notes   Manual Lymph Drainage No MLD completed. Will begin during subsequent sessions. Physiotouch -       Specific Interventions Next Treatment:   1) Educate patient on Lymphedema Awareness. 2) Initiate Complete Decongestive Therapy/Manual Lymph Drainage (CDT/MLD)-Left progressing to bilateral.  3) Recommendations of pneumatic compression pump for home use at discharge. 4) Assess patient's current compression knee high stockings, making recommendations as appropriate. 5) Ongoing family/patient training/education.     Activity/Evaluation/Treatment Tolerance:  [x]  Patient tolerated evaluation/treatment well []  Patient limited by fatigue  []  Patient limited by pain    []  Patient limited by medical complications  []  Other:     Patient Education:   [x]  HEP/Education Completed: Plan of Care, Goals, Refer to \"Conservative Lymphedema Treatment\" above. [employer] Access Code:  []  No new Education completed  []  Reviewed Prior HEP      [x]  Patient verbalized and/or demonstrated understanding of education provided. []  Patient unable to verbalize and/or demonstrate understanding of education provided. Will continue education. [x]  Barriers to learning: Physical    Assessment: Patient tolerated today's evaluation without reports of increased symptoms. Patient's wife Zechariah Abrams) was very supportive of the patient. Body Structures/Functions/Activity Limitations: Increased edema, Increased risk of infection, Decreased ADL's, Decreased endurance, Decreased coordination, and Decreased balance  Prognosis: good    GOALS:  Patient Goal: \"If the swelling would go down, I'd be able to move around better and put my regular shoes and socks on myself\" per patient. Short Term Goals:  Time Frame: 6 weeks   Patient will demonstrate a decrease in circumferential measurements of the affected extremity by 10 cm working towards the lymphedema swelling stabilizing and patient being able to get measured and fitted for a new compression garment to be worn daily to keep swelling down. Long Term Goals:  Time Frame: 12 weeks  * Patient/family/caregiver will demonstrate donning/doffing compression garments with modified independence to wear compression garments daily to keep swelling down. * Patient/family/caregiver will verbalize a good understanding regarding proper wearing and replacement schedule, precautions and care of garment(s). Patient will demonstrate a decrease by at least 5 points in LLIS (Version 2) demonstrating improved functional mobility/quality of life. Patient will be able to ermias his regular size 12-12.5 shoes without difficulty and without assistance from his wife.      PLAN:  Treatment Recommendations:Manual Therapy/Manual Lymph Drainage, Skin Care/Education, Compression Bandaging, Garment Fitting/Assessment, Self-Care Training and Safety, Functional Mobility Training, Safety Training, Patient Education, and Home Exercise Program    [x]  Plan of care initiated. Plan to see patient 2 times per week for 12 weeks to address the treatment planned outlined above. [x]  Referral for Sequential Compression Pump (HIGHLY RECOMMEND)  []  Continue with current plan of care  []  Modify plan of care as follows:    []  Hold pending physician visit  []  Discharge    THE PATIENT DEMONSTRATES GOOD POTENTIAL TO MAKE GOOD PROGRESS AND MEET ALL GOALS WITH TREATMENTS COMPLETED BY A SKILLED PHYSICAL THERAPIST/PHYSICAL THERAPIST ASSISTANT. Time In 1305   Time Out 1400   Timed Code Minutes: 0 min   Total Treatment Time: 55 min     Thank you Dr. Edna Nuñez MD for allowing me to assist in the care of your patient.     Electronically Signed by: Bertram Russ PT, KAYLIE-BAR ZAMORANO on 2/10/2023

## 2023-02-24 ENCOUNTER — HOSPITAL ENCOUNTER (OUTPATIENT)
Dept: PHYSICAL THERAPY | Age: 43
Setting detail: THERAPIES SERIES
End: 2023-02-24
Payer: COMMERCIAL

## 2023-03-08 ENCOUNTER — OFFICE VISIT (OUTPATIENT)
Dept: PULMONOLOGY | Age: 43
End: 2023-03-08
Payer: COMMERCIAL

## 2023-03-08 VITALS
BODY MASS INDEX: 40.43 KG/M2 | HEART RATE: 77 BPM | OXYGEN SATURATION: 94 % | WEIGHT: 315 LBS | SYSTOLIC BLOOD PRESSURE: 138 MMHG | TEMPERATURE: 98 F | HEIGHT: 74 IN | DIASTOLIC BLOOD PRESSURE: 84 MMHG

## 2023-03-08 DIAGNOSIS — R06.02 SOB (SHORTNESS OF BREATH): ICD-10-CM

## 2023-03-08 DIAGNOSIS — J98.4 RESTRICTIVE AIRWAY DISEASE: ICD-10-CM

## 2023-03-08 DIAGNOSIS — G47.33 OSA TREATED WITH BIPAP: Primary | ICD-10-CM

## 2023-03-08 PROCEDURE — 99214 OFFICE O/P EST MOD 30 MIN: CPT | Performed by: NURSE PRACTITIONER

## 2023-03-08 ASSESSMENT — ENCOUNTER SYMPTOMS
STRIDOR: 0
EYES NEGATIVE: 1
VOMITING: 0
SHORTNESS OF BREATH: 1
GASTROINTESTINAL NEGATIVE: 1
NAUSEA: 0
CHEST TIGHTNESS: 0
DIARRHEA: 0
WHEEZING: 0
ALLERGIC/IMMUNOLOGIC NEGATIVE: 1

## 2023-03-08 NOTE — PROGRESS NOTES
[facility]      Hotrencia Dowd         [id number]  3/8/2023   Chief Complaint   Patient presents with    Follow-up     Yo follow up after pap set up         Pt of Dr. Harleen Mckeon    PAP Download:   Original or initial AHI: 107.2     Date of initial study: 10/20/2022      Compliant  93%     Noncompliant 3 %     PAP Type Spont   Level  25/21 cmH2O    Avg Hrs/Day 5 hours 39 minutes   AHI: 1.2   Recorded compliance dates , 02/05/2023  to 03/06/2023   Machine/Mfg:   [x] ResMed    [] Respironics/Dreamstation   Interface:   [] Nasal    [] Nasal pillows   [] FFM      Provider:      [x] [facility]     []Tico     [] Jaylene    [] Gab Mahajan    [] [place]               [] [facility]      [] Adaptive    [] [address]      [] Other    Neck Size: 19  Mallampati Mallampati 4  ESS:  10  SAQLI: 72    Here is a scan of the most recent download:                Presentation:   Aminata Deleon presents for sleep medicine follow up for obstructive sleep apnea  Since the last visit, Aminata Deleon has been started on BiPAP for treatment of YO. AHI is much improved   MO BMI 50  Still c/o ongoing SOB more with exertion never positive dx of Covid but suspects   Sometimes even the most mild activity can cause SOB- chronic leg swelling appt at Lymphedema clinic this Friday   Cardiology seen no stress test done ? Need   Oxygen 2LPM bleed into BiPAP already   ? Environmental allergies     Equipment issues: The pressure is  acceptable, the mask is semi acceptable      Sleep issues:  Do you feel better? Yes  More rested? Sometimes   Better concentration? yes    Progress History:   Since last visit any new medical issues? No  New ER or hospital visits? No  Any new or changes in medicines? No  Any new sleep medicines? No    Review of Systems -   Review of Systems   Constitutional: Negative. Negative for chills, fever and unexpected weight change. HENT: Negative. Eyes: Negative.     Respiratory:  Positive for shortness of breath. Negative for chest tightness, wheezing and stridor. Cardiovascular:  Positive for leg swelling. Negative for chest pain. Gastrointestinal: Negative. Negative for diarrhea, nausea and vomiting. Endocrine: Negative. Genitourinary: Negative. Negative for dysuria. Musculoskeletal: Negative. Skin: Negative. Allergic/Immunologic: Negative. Neurological: Negative. Hematological: Negative. Psychiatric/Behavioral: Negative. Physical Exam:    BMI:  Body mass index is 50.59 kg/m². Wt Readings from Last 3 Encounters:   03/08/23 (!) 394 lb (178.7 kg)   01/27/23 283 lb (128.4 kg)   01/26/23 (!) 386 lb 3.2 oz (175.2 kg)     Weight stable / unchanged  Vitals: /84   Pulse 77   Temp 98 °F (36.7 °C)   Ht 6' 2\" (1.88 m)   Wt (!) 394 lb (178.7 kg)   SpO2 94%   BMI 50.59 kg/m²       Physical Exam  Vitals and nursing note reviewed. Constitutional:       General: He is not in acute distress. Appearance: He is morbidly obese. HENT:      Head: Normocephalic and atraumatic. Neck:      Trachea: No tracheal deviation. Cardiovascular:      Rate and Rhythm: Normal rate and regular rhythm. Heart sounds: Normal heart sounds. No murmur heard. Pulmonary:      Effort: Pulmonary effort is normal. No respiratory distress. Breath sounds: Normal breath sounds. No stridor. No wheezing or rales. Chest:      Chest wall: No tenderness. Abdominal:      General: Bowel sounds are normal. There is no distension. Palpations: Abdomen is soft. Skin:     General: Skin is warm and dry. Capillary Refill: Capillary refill takes less than 2 seconds. Neurological:      Mental Status: He is alert and oriented to person, place, and time. Psychiatric:         Behavior: Behavior normal.         Thought Content: Thought content normal.         Judgment: Judgment normal.     ASSESSMENT/DIAGNOSIS     Diagnosis Orders   1. ROMA treated with BiPAP        2.  SOB (shortness of breath)  CBC with Auto Differential    Ferritin    Allergen, Respiratory, Region 5 Panel    CT CHEST HIGH RESOLUTION    IgE      3. Restrictive airway disease  CT CHEST HIGH RESOLUTION           Plan   Do you need any equipment today? No  - Download reviewed and discussed with Shawna Valles and myself today in the office   - He  was advised to continue current positive airway pressure therapy with above described pressure. - He  advised to keep good compliance with current recommended pressure to get optimal results and clinical improvement  - Recommend 7-9 hours of sleep with PAP  - He was advised to call [employer] regarding supplies if needed.   -He call my office for earlier appointment if needed for worsening of sleep symptoms.   - He was instructed on weight loss and instructed to reach out to PCP for further options this would be also beneficial for helping with his ongoing SOB  - Shawna Valles was educated about my impression and plan. Patient verbalizesunderstanding.   We will see Kervin Ziegler back in: 6 months with download  -Due to ongoing SOB will get HRCT and lab work done     Information added by my medical assistant/LPN was reviewed today   Electronically signed by SIMRAN Dickson CNP on 3/8/2023 at 8:48 AM

## 2023-03-10 ENCOUNTER — HOSPITAL ENCOUNTER (OUTPATIENT)
Dept: PHYSICAL THERAPY | Age: 43
Setting detail: THERAPIES SERIES
Discharge: HOME | End: 2023-03-10
Payer: COMMERCIAL

## 2023-03-10 PROCEDURE — 97535 SELF CARE MNGMENT TRAINING: CPT

## 2023-03-10 NOTE — DISCHARGE SUMMARY
[address] [facility]  PHYSICAL THERAPY  [] LYMPHEDEMA SERVICES EVALUATION  [] DAILY NOTE [] PROGRESS NOTE [x] DISCHARGE NOTE    [x] OUTPATIENT REHABILITATION CENTER [facility]   (Lymphedema Services)   [] Uli Reyes    [] [facility]   [] Les Gonzáles    Date: 3/10/2023  Patient Name:  Adrienne Navarro  : 1980  MRN: [id number]  CSN: [id number]    Referring Practitioner Dr. Sima Russell MD   Diagnosis Localized edema [R60.0]    Treatment Diagnosis -Lymphedema swelling of bilateral lower extremities and lower truncal region. Date of Evaluation 2/10/23       Functional Outcome Measure Used LYMPHEDEMA LIFE IMPACT SCALE (LLIS)-Version 2. Functional Outcome Score 30 (2/10/23-Baseline score)   30 (3/19/23-Discharge score)      Insurance: Primary: Payor: Davin Serrano /  /  / ,   Secondary:    Authorization Information: PRE CERTIFICATION REQUIRED: Sukhjinder Dickinson @ [phone] SPOKE TO [address] 57 AUTH# [id number] FROM 02/10/23-23. AFTER EVAL WE MUST GET ADDITIONAL Jessenia RUTH @ [phone] REF# Arturo Wesley [facility] 23   Visit #    Visits Allowed: -Soham Vaughan FOR 6 VISITS FROM 2/15/23-4/15/23  FOR CPT CODES:  56086, 87934, 50420, 70004   Recertification Date:  (12 Weeks)   Physician Follow-Up: No follow up appointment scheduled with patient's referring provider. [facility]      Cancer History No History of Cancer   Prior Lymphedema Treatment -Utilized Compression Garment: Worn almost daily (Over-the-counter) knee high (30-40 mm Hg). -Utilized Elevation: Overnight swelling decreases   -Exercise: Limited exercise program due to significant fatigue due to severe sleep apnea, which he has starting using a BiPAP machine with oxygen approximately one week ago. -Diet: Regular diet.    Additional Pertinent History -Multiple Sclerosis   Restrictions/Precautions Standard/Universal Precautions, Fall Risk, and High Risk for Infection   Pain No pain reported. SUBJECTIVE:     The patient presented to the Lymphedema clinic on this date and reported that he is feeling better. \"I always have that little bit of hypersensitivity, but it doesn't really hurt\" per patient. Conservative Lymphedema Treatment   X in shaded column indicates activity completed today   Treatment/Intervention   Notes   Skin Care Instructed patient on skin care precautions. and Instructed patient on steps to prevent edema and infection. X BLE   Compression Bandaging None Provided     Lymphedema Education -Patient reported that his main goal of coming to the Lymphedema clinic is to be able to get a compression pump to use at home. Reviewed process used to have patient qualify for a compression pump  -Reviewed purpose of Lymphedema treatments and goals, staging. X    Kinesiotaping -     Decongestive Therapy Exercises The patient was able to complete Decongestive Therapy exercises (x 10 reps) including ankle pumps. The exercises were completed with compression bandages on, without complaints of pain from the patient. The Decongestive Therapy exercises assist with decreasing the swelling by increasing the muscle pumping action of the lymph collectors and by increasing the fluid uptake in the lymphatic system. -BLE  -Instructed the patient to complete x 10 ankle pumps hourly. Manual Therapy Time/Technique  Notes   Manual Lymph Drainage No MLD completed. Will begin during subsequent sessions. Patient Education:   [x]  HEP/Education Completed: Plan of Care, Goals, Refer to \"Conservative Lymphedema Treatment\" above. [employer] Access Code:  []  No new Education completed  []  Reviewed Prior HEP      [x]  Patient verbalized and/or demonstrated understanding of education provided. []  Patient unable to verbalize and/or demonstrate understanding of education provided. Will continue education.   [x]  Barriers to learning: Physical    Assessment: The patient is a 36year old male who underwent an initial evaluation on 02/10/23. The patient presented on this date for his first Complete Decongestive Therapy/Manual Lymph Drainage (CDT/MLD) treatment session. However, after beginning education/discussion, patient expressed his desire to get a compression pump and not necessarily undergo the Lymphedema Conservative treatment, patient feels it is more vascular related. Therapist recommended a consultation with a vascular specialist (ie. Gabriela Shelton, Dr. Mary Lou Joseph MD), provided patient with telephone and fax number. Therefore, no short or long term goals have been met due to patient's desire to be discharged and assessment at [facility]. Prognosis: good    GOALS:  Patient Goal: \"If the swelling would go down, I'd be able to move around better and put my regular shoes and socks on myself\" per patient. Short Term Goals:  Time Frame: 6 weeks  Patient will demonstrate a decrease in circumferential measurements of the affected extremity by 10 cm working towards the lymphedema swelling stabilizing and patient being able to get measured and fitted for a new compression garment to be worn daily to keep swelling down. (NOT MET)    Long Term Goals:  Time Frame: 12 weeks  * Patient/family/caregiver will demonstrate donning/doffing compression garments with modified independence to wear compression garments daily to keep swelling down. (NOT MET)  * Patient/family/caregiver will verbalize a good understanding regarding proper wearing and replacement schedule, precautions and care of garment(s). (NOT MET)  Patient will demonstrate a decrease by at least 5 points in LLIS (Version 2) demonstrating improved functional mobility/quality of life. Patient will be able to ermias his regular size 12-12.5 shoes without difficulty and without assistance from his wife. (NOT MET)    PLAN:  []  Plan of care initiated.   Plan to see patient 2 times per week for 12 weeks to address the treatment planned outlined above. []  Referral for Sequential Compression Pump (HIGHLY RECOMMEND)  []  Continue with current plan of care  []  Modify plan of care as follows:    []  Hold pending physician visit  [x]  Discharge: Per patient request; Recommendation for consultation with vascular specialist (ie. [address] with Dr. Theresa Tolentino MD). Time In 1250   Time Out 1210   Timed Code Minutes: 20 min   Total Treatment Time: 20 min     Thank you Dr. Gail Soto MD for allowing me to assist in the care of your patient.     Electronically Signed by: Jenny Elkins PT, CHIDIT-LONA, BAR on 3/10/2023

## 2023-06-26 ENCOUNTER — HOSPITAL ENCOUNTER (OUTPATIENT)
Dept: GENERAL RADIOLOGY | Age: 43
Discharge: HOME | End: 2023-06-26
Payer: COMMERCIAL

## 2023-06-26 ENCOUNTER — HOSPITAL ENCOUNTER (OUTPATIENT)
Age: 43
Discharge: HOME | End: 2023-06-26
Payer: COMMERCIAL

## 2023-06-26 DIAGNOSIS — M25.541 ARTHRALGIA OF BOTH HANDS: ICD-10-CM

## 2023-06-26 DIAGNOSIS — M25.542 ARTHRALGIA OF BOTH HANDS: ICD-10-CM

## 2023-06-26 DIAGNOSIS — M25.561 RIGHT KNEE PAIN, UNSPECIFIED CHRONICITY: ICD-10-CM

## 2023-06-26 LAB
BASOPHILS ABSOLUTE: 0.1 THOU/MM3 (ref 0–0.1)
BASOPHILS NFR BLD AUTO: 0.8 %
DEPRECATED RDW RBC AUTO: 47.1 FL (ref 35–45)
EOSINOPHIL NFR BLD AUTO: 2.8 %
EOSINOPHILS ABSOLUTE: 0.3 THOU/MM3 (ref 0–0.4)
ERYTHROCYTE [DISTWIDTH] IN BLOOD BY AUTOMATED COUNT: 12.6 % (ref 11.5–14.5)
HCT VFR BLD AUTO: 47.1 % (ref 42–52)
HGB BLD-MCNC: 15.2 GM/DL (ref 14–18)
IMM GRANULOCYTES # BLD AUTO: 0.05 THOU/MM3 (ref 0–0.07)
IMM GRANULOCYTES NFR BLD AUTO: 0.4 %
LYMPHOCYTES ABSOLUTE: 2.5 THOU/MM3 (ref 1–4.8)
LYMPHOCYTES NFR BLD AUTO: 20.8 %
MCH RBC QN AUTO: 32.3 PG (ref 26–33)
MCHC RBC AUTO-ENTMCNC: 32.3 GM/DL (ref 32.2–35.5)
MCV RBC AUTO: 100.2 FL (ref 80–94)
MONOCYTES ABSOLUTE: 0.8 THOU/MM3 (ref 0.4–1.3)
MONOCYTES NFR BLD AUTO: 6.9 %
NEUTROPHILS NFR BLD AUTO: 68.3 %
NRBC BLD AUTO-RTO: 0 /100 WBC
PLATELET # BLD AUTO: 273 THOU/MM3 (ref 130–400)
PMV BLD AUTO: 10.5 FL (ref 9.4–12.4)
RBC # BLD AUTO: 4.7 MILL/MM3 (ref 4.7–6.1)
SEGMENTED NEUTROPHILS ABSOLUTE COUNT: 8.1 THOU/MM3 (ref 1.8–7.7)
WBC # BLD AUTO: 11.9 THOU/MM3 (ref 4.8–10.8)

## 2023-06-26 PROCEDURE — 86430 RHEUMATOID FACTOR TEST QUAL: CPT

## 2023-06-26 PROCEDURE — 86140 C-REACTIVE PROTEIN: CPT

## 2023-06-26 PROCEDURE — 36415 COLL VENOUS BLD VENIPUNCTURE: CPT

## 2023-06-26 PROCEDURE — 73130 X-RAY EXAM OF HAND: CPT

## 2023-06-26 PROCEDURE — 73562 X-RAY EXAM OF KNEE 3: CPT

## 2023-06-26 PROCEDURE — 85025 COMPLETE CBC W/AUTO DIFF WBC: CPT

## 2023-06-26 PROCEDURE — 80053 COMPREHEN METABOLIC PANEL: CPT

## 2023-06-26 PROCEDURE — 85651 RBC SED RATE NONAUTOMATED: CPT

## 2023-06-27 LAB
ALBUMIN SERPL BCG-MCNC: 4.2 G/DL (ref 3.5–5.1)
ALP SERPL-CCNC: 53 U/L (ref 38–126)
ALT SERPL W/O P-5'-P-CCNC: 25 U/L (ref 11–66)
ANION GAP SERPL CALC-SCNC: 15 MEQ/L (ref 8–16)
AST SERPL-CCNC: 20 U/L (ref 5–40)
BILIRUB SERPL-MCNC: 0.5 MG/DL (ref 0.3–1.2)
BUN SERPL-MCNC: 14 MG/DL (ref 7–22)
CALCIUM SERPL-MCNC: 9.3 MG/DL (ref 8.5–10.5)
CHLORIDE SERPL-SCNC: 103 MEQ/L (ref 98–111)
CO2 SERPL-SCNC: 24 MEQ/L (ref 23–33)
CREAT SERPL-MCNC: 0.9 MG/DL (ref 0.4–1.2)
CRP SERPL-MCNC: 0.52 MG/DL (ref 0–1)
ERYTHROCYTE [SEDIMENTATION RATE] IN BLOOD BY WESTERGREN METHOD: 12 MM/HR (ref 0–10)
GFR SERPL CREATININE-BSD FRML MDRD: > 60 ML/MIN/1.73M2
GLUCOSE SERPL-MCNC: 123 MG/DL (ref 70–108)
POTASSIUM SERPL-SCNC: 3.8 MEQ/L (ref 3.5–5.2)
PROT SERPL-MCNC: 7 G/DL (ref 6.1–8)
RHEUMATOID FACT SERPL-ACNC: < 10 IU/ML (ref 0–13)
SODIUM SERPL-SCNC: 142 MEQ/L (ref 135–145)

## 2023-08-23 ENCOUNTER — TELEPHONE (OUTPATIENT)
Dept: PULMONOLOGY | Age: 43
End: 2023-08-23

## 2025-01-20 ENCOUNTER — HOSPITAL ENCOUNTER (EMERGENCY)
Age: 45
Discharge: HOME | End: 2025-01-20
Payer: COMMERCIAL

## 2025-01-20 VITALS
SYSTOLIC BLOOD PRESSURE: 134 MMHG | WEIGHT: 315 LBS | HEART RATE: 94 BPM | DIASTOLIC BLOOD PRESSURE: 84 MMHG | BODY MASS INDEX: 40.43 KG/M2 | HEIGHT: 74 IN | OXYGEN SATURATION: 95 % | RESPIRATION RATE: 18 BRPM | TEMPERATURE: 100.8 F

## 2025-01-20 DIAGNOSIS — J10.1 INFLUENZA A: Primary | ICD-10-CM

## 2025-01-20 LAB
FLUAV AG SPEC QL: POSITIVE
FLUBV AG SPEC QL: NEGATIVE
S PYO AG THROAT QL: NEGATIVE

## 2025-01-20 PROCEDURE — 87651 STREP A DNA AMP PROBE: CPT

## 2025-01-20 PROCEDURE — 87804 INFLUENZA ASSAY W/OPTIC: CPT

## 2025-01-20 PROCEDURE — 99213 OFFICE O/P EST LOW 20 MIN: CPT

## 2025-01-20 PROCEDURE — 99214 OFFICE O/P EST MOD 30 MIN: CPT

## 2025-01-20 RX ORDER — IBUPROFEN 200 MG
400 TABLET ORAL EVERY 6 HOURS PRN
COMMUNITY

## 2025-01-20 RX ORDER — BENZONATATE 200 MG/1
200 CAPSULE ORAL 3 TIMES DAILY PRN
Qty: 21 CAPSULE | Refills: 0 | Status: SHIPPED | OUTPATIENT
Start: 2025-01-20 | End: 2025-01-27

## 2025-01-20 RX ORDER — LISINOPRIL 20 MG/1
20 TABLET ORAL DAILY
COMMUNITY
Start: 2025-01-13

## 2025-01-20 RX ORDER — HYDROXYZINE HYDROCHLORIDE 25 MG/1
25 TABLET, FILM COATED ORAL 3 TIMES DAILY PRN
COMMUNITY

## 2025-01-20 ASSESSMENT — PAIN DESCRIPTION - LOCATION
LOCATION: GENERALIZED
LOCATION_2: THROAT

## 2025-01-20 ASSESSMENT — PAIN DESCRIPTION - PAIN TYPE: TYPE: ACUTE PAIN

## 2025-01-20 ASSESSMENT — ENCOUNTER SYMPTOMS
VOMITING: 0
EYES NEGATIVE: 1
DIARRHEA: 0
COUGH: 1
ALLERGIC/IMMUNOLOGIC NEGATIVE: 1
SORE THROAT: 1
NAUSEA: 0

## 2025-01-20 ASSESSMENT — PAIN DESCRIPTION - INTENSITY: RATING_2: 6

## 2025-01-20 ASSESSMENT — PAIN DESCRIPTION - DESCRIPTORS
DESCRIPTORS_2: ACHING
DESCRIPTORS: ACHING

## 2025-01-20 ASSESSMENT — PAIN - FUNCTIONAL ASSESSMENT: PAIN_FUNCTIONAL_ASSESSMENT: 0-10

## 2025-01-20 ASSESSMENT — PAIN SCALES - GENERAL: PAINLEVEL_OUTOF10: 5

## 2025-01-20 ASSESSMENT — PAIN DESCRIPTION - FREQUENCY: FREQUENCY: CONTINUOUS

## 2025-01-20 NOTE — DISCHARGE INSTRUCTIONS
Medications as prescribed.  Increase fluid intake.  Can take over-the-counter Zyrtec, Flonase and Coricidin HBP for congestion.  Can alternate over-the-counter Motrin and Tylenol as needed for fever.  Follow-up with family doctor in 3 to 5 days.  Go to the emergency room for any difficulty breathing new or worsening symptoms.

## 2025-01-20 NOTE — ED TRIAGE NOTES
Pt c/o cough, fatigue, fever 99.9F , chills and nasal congestion. Pt exposed to Influenza A % Strep by daughter. Rapid flu and strep obtained and sent to lab.

## 2025-01-20 NOTE — ED PROVIDER NOTES
[facility] URGENT CARE  Urgent Care Encounter       CHIEF COMPLAINT       Chief Complaint   Patient presents with    Cough     Exposed to influenza A & Strep    Fatigue    Pharyngitis    Nasal Congestion    Fever     99.9 F       Nurses Notes reviewed and I agree except as noted in the HPI.  HISTORY OF PRESENT ILLNESS   Houston Dumas is a 44 y.o. male who presents to urgent care for cough, fever, fatigue, sore throat and nasal congestion.  Symptoms started 1 day ago.  States took ibuprofen prior to arrival to urgent care.  Started over the counter cough and cold medication with little relief.  Denies nausea, vomiting and diarrhea.  States that his daughter tested positive for strep and influenza A over the weekend.    The history is provided by the patient. No  was used.       REVIEW OF SYSTEMS     Review of Systems   Constitutional:  Positive for fatigue and fever.   HENT:  Positive for congestion and sore throat.    Eyes: Negative.    Respiratory:  Positive for cough.    Cardiovascular: Negative.    Gastrointestinal:  Negative for diarrhea, nausea and vomiting.   Endocrine: Negative.    Genitourinary: Negative.    Musculoskeletal: Negative.    Skin: Negative.    Allergic/Immunologic: Negative.    Neurological: Negative.    Hematological: Negative.    Psychiatric/Behavioral: Negative.         PAST MEDICAL HISTORY         Diagnosis Date    Hypertension     MS (multiple sclerosis) ([facility])        SURGICALHISTORY     Patient  has no past surgical history on file.    CURRENT MEDICATIONS       Previous Medications    FUROSEMIDE (LASIX) 20 MG TABLET    Take 20 mg by mouth 2 times daily    HYDROXYZINE HCL (ATARAX) 25 MG TABLET    Take 1 tablet by mouth 3 times daily as needed for Itching    IBUPROFEN (ADVIL;MOTRIN) 200 MG TABLET    Take 2 tablets by mouth every 6 hours as needed for Pain    IBUPROFEN (IBU) 800 MG TABLET    Take 1 tablet by mouth every 8 hours as needed for Pain Take with food.     LISINOPRIL (PRINIVIL;ZESTRIL) 20 MG TABLET    Take 1 tablet by mouth daily    POTASSIUM (POTASSIMIN PO)    Take by mouth    SILDENAFIL CITRATE (VIAGRA PO)    Take by mouth       ALLERGIES     Patient is has No Known Allergies.    Patients There is no immunization history for the selected administration types on file for this patient.    FAMILY HISTORY     Patient's family history is not on file.    SOCIAL HISTORY     Patient  reports that he has never smoked. He has never used smokeless tobacco. He reports that he does not drink alcohol and does not use drugs.    PHYSICAL EXAM     ED TRIAGE VITALS  BP: 134/84, Temp: (!) 100.8 °F (38.2 °C), Pulse: 94, Respirations: 18, SpO2: 95 %,Estimated body mass index is 51.36 kg/m² as calculated from the following:    Height as of this encounter: 1.88 m (6' 2\").    Weight as of this encounter: 181.4 kg (400 lb).,No LMP for male patient.    Physical Exam  Vitals and nursing note reviewed.   Constitutional:       General: He is not in acute distress.     Appearance: He is well-developed and normal weight. He is not ill-appearing.   HENT:      Head: Normocephalic and atraumatic.      Right Ear: Tympanic membrane and ear canal normal.      Left Ear: Tympanic membrane and ear canal normal.      Nose: Congestion present.      Mouth/Throat:      Mouth: Mucous membranes are moist.      Pharynx: Posterior oropharyngeal erythema present.   Eyes:      Conjunctiva/sclera: Conjunctivae normal.   Cardiovascular:      Rate and Rhythm: Normal rate and regular rhythm.      Heart sounds: Normal heart sounds.   Pulmonary:      Effort: Pulmonary effort is normal. No respiratory distress.      Breath sounds: Normal breath sounds. No stridor. No wheezing, rhonchi or rales.   Chest:      Chest wall: No tenderness.   Lymphadenopathy:      Cervical: No cervical adenopathy.   Skin:     General: Skin is warm and dry.      Capillary Refill: Capillary refill takes less than 2 seconds.   Neurological: